# Patient Record
Sex: MALE | Race: WHITE | HISPANIC OR LATINO | Employment: FULL TIME | URBAN - METROPOLITAN AREA
[De-identification: names, ages, dates, MRNs, and addresses within clinical notes are randomized per-mention and may not be internally consistent; named-entity substitution may affect disease eponyms.]

---

## 2022-09-20 ENCOUNTER — HOSPITAL ENCOUNTER (EMERGENCY)
Facility: HOSPITAL | Age: 37
Discharge: HOME | End: 2022-09-20
Attending: EMERGENCY MEDICINE

## 2022-09-20 DIAGNOSIS — S61.319A LACERATION OF NAIL BED OF FINGER, INITIAL ENCOUNTER: ICD-10-CM

## 2022-09-20 DIAGNOSIS — S68.522A PARTIAL TRAUMATIC AMPUTATION OF LEFT THUMB THROUGH PHALANX, INITIAL ENCOUNTER: ICD-10-CM

## 2022-09-20 DIAGNOSIS — S61.019A: ICD-10-CM

## 2022-09-20 DIAGNOSIS — S62.502B OPEN AVULSION FRACTURE OF PHALANX OF LEFT THUMB, INITIAL ENCOUNTER: ICD-10-CM

## 2022-09-20 DIAGNOSIS — S61.112A LACERATION OF LEFT THUMB WITHOUT FOREIGN BODY WITH DAMAGE TO NAIL, INITIAL ENCOUNTER: Primary | ICD-10-CM

## 2022-09-20 LAB
ANION GAP SERPL CALC-SCNC: 10 MMOL/L (ref 8–16)
BASOPHILS # BLD AUTO: 0.04 K/UL (ref 0–0.2)
BASOPHILS NFR BLD: 0.7 % (ref 0–1.9)
BUN SERPL-MCNC: 12 MG/DL (ref 6–20)
CALCIUM SERPL-MCNC: 9.9 MG/DL (ref 8.7–10.5)
CHLORIDE SERPL-SCNC: 107 MMOL/L (ref 95–110)
CO2 SERPL-SCNC: 24 MMOL/L (ref 23–29)
CREAT SERPL-MCNC: 0.8 MG/DL (ref 0.5–1.4)
DIFFERENTIAL METHOD: ABNORMAL
EOSINOPHIL # BLD AUTO: 0.1 K/UL (ref 0–0.5)
EOSINOPHIL NFR BLD: 1.2 % (ref 0–8)
ERYTHROCYTE [DISTWIDTH] IN BLOOD BY AUTOMATED COUNT: 12.5 % (ref 11.5–14.5)
EST. GFR  (NO RACE VARIABLE): >60 ML/MIN/1.73 M^2
GLUCOSE SERPL-MCNC: 110 MG/DL (ref 70–110)
HCT VFR BLD AUTO: 38.4 % (ref 40–54)
HGB BLD-MCNC: 13.6 G/DL (ref 14–18)
IMM GRANULOCYTES # BLD AUTO: 0.01 K/UL (ref 0–0.04)
IMM GRANULOCYTES NFR BLD AUTO: 0.2 % (ref 0–0.5)
LYMPHOCYTES # BLD AUTO: 1.8 K/UL (ref 1–4.8)
LYMPHOCYTES NFR BLD: 30.1 % (ref 18–48)
MCH RBC QN AUTO: 31.1 PG (ref 27–31)
MCHC RBC AUTO-ENTMCNC: 35.4 G/DL (ref 32–36)
MCV RBC AUTO: 88 FL (ref 82–98)
MONOCYTES # BLD AUTO: 0.3 K/UL (ref 0.3–1)
MONOCYTES NFR BLD: 5.1 % (ref 4–15)
NEUTROPHILS # BLD AUTO: 3.7 K/UL (ref 1.8–7.7)
NEUTROPHILS NFR BLD: 62.7 % (ref 38–73)
NRBC BLD-RTO: 0 /100 WBC
PLATELET # BLD AUTO: 287 K/UL (ref 150–450)
PMV BLD AUTO: 8.3 FL (ref 9.2–12.9)
POTASSIUM SERPL-SCNC: 3.8 MMOL/L (ref 3.5–5.1)
RBC # BLD AUTO: 4.38 M/UL (ref 4.6–6.2)
SARS-COV-2 RDRP RESP QL NAA+PROBE: NEGATIVE
SODIUM SERPL-SCNC: 141 MMOL/L (ref 136–145)
WBC # BLD AUTO: 5.91 K/UL (ref 3.9–12.7)

## 2022-09-20 PROCEDURE — 26951 PR AMPUTATION FINGER/THUMB: ICD-10-PCS | Mod: FA,,, | Performed by: ORTHOPAEDIC SURGERY

## 2022-09-20 PROCEDURE — 96376 TX/PRO/DX INJ SAME DRUG ADON: CPT

## 2022-09-20 PROCEDURE — 63600175 PHARM REV CODE 636 W HCPCS: Performed by: ORTHOPAEDIC SURGERY

## 2022-09-20 PROCEDURE — 99285 EMERGENCY DEPT VISIT HI MDM: CPT | Mod: 25

## 2022-09-20 PROCEDURE — U0002 COVID-19 LAB TEST NON-CDC: HCPCS | Performed by: EMERGENCY MEDICINE

## 2022-09-20 PROCEDURE — 13131 CMPLX RPR F/C/C/M/N/AX/G/H/F: CPT

## 2022-09-20 PROCEDURE — 96374 THER/PROPH/DIAG INJ IV PUSH: CPT

## 2022-09-20 PROCEDURE — 63600175 PHARM REV CODE 636 W HCPCS: Performed by: EMERGENCY MEDICINE

## 2022-09-20 PROCEDURE — 71000015 HC POSTOP RECOV 1ST HR: Performed by: ORTHOPAEDIC SURGERY

## 2022-09-20 PROCEDURE — 85025 COMPLETE CBC W/AUTO DIFF WBC: CPT | Performed by: EMERGENCY MEDICINE

## 2022-09-20 PROCEDURE — 26951 AMPUTATION OF FINGER/THUMB: CPT | Mod: FA,,, | Performed by: ORTHOPAEDIC SURGERY

## 2022-09-20 PROCEDURE — 80048 BASIC METABOLIC PNL TOTAL CA: CPT | Performed by: EMERGENCY MEDICINE

## 2022-09-20 PROCEDURE — 96375 TX/PRO/DX INJ NEW DRUG ADDON: CPT

## 2022-09-20 RX ORDER — FENTANYL CITRATE 50 UG/ML
25 INJECTION, SOLUTION INTRAMUSCULAR; INTRAVENOUS EVERY 5 MIN PRN
Status: CANCELLED | OUTPATIENT
Start: 2022-09-20

## 2022-09-20 RX ORDER — CEFAZOLIN SODIUM 2 G/50ML
2 SOLUTION INTRAVENOUS
Status: COMPLETED | OUTPATIENT
Start: 2022-09-20 | End: 2022-09-20

## 2022-09-20 RX ORDER — HYDROMORPHONE HYDROCHLORIDE 1 MG/ML
1 INJECTION, SOLUTION INTRAMUSCULAR; INTRAVENOUS; SUBCUTANEOUS ONCE
Status: DISCONTINUED | OUTPATIENT
Start: 2022-09-20 | End: 2022-09-20

## 2022-09-20 RX ORDER — MORPHINE SULFATE 4 MG/ML
4 INJECTION, SOLUTION INTRAMUSCULAR; INTRAVENOUS
Status: COMPLETED | OUTPATIENT
Start: 2022-09-20 | End: 2022-09-20

## 2022-09-20 RX ORDER — DIPHENHYDRAMINE HYDROCHLORIDE 50 MG/ML
25 INJECTION INTRAMUSCULAR; INTRAVENOUS EVERY 6 HOURS PRN
Status: CANCELLED | OUTPATIENT
Start: 2022-09-20

## 2022-09-20 RX ORDER — ONDANSETRON 2 MG/ML
4 INJECTION INTRAMUSCULAR; INTRAVENOUS EVERY 6 HOURS PRN
Status: DISCONTINUED | OUTPATIENT
Start: 2022-09-20 | End: 2022-09-21 | Stop reason: HOSPADM

## 2022-09-20 RX ORDER — MORPHINE SULFATE 4 MG/ML
6 INJECTION, SOLUTION INTRAMUSCULAR; INTRAVENOUS
Status: COMPLETED | OUTPATIENT
Start: 2022-09-20 | End: 2022-09-20

## 2022-09-20 RX ORDER — ONDANSETRON 2 MG/ML
4 INJECTION INTRAMUSCULAR; INTRAVENOUS
Status: COMPLETED | OUTPATIENT
Start: 2022-09-20 | End: 2022-09-20

## 2022-09-20 RX ORDER — ALBUTEROL SULFATE 0.83 MG/ML
2.5 SOLUTION RESPIRATORY (INHALATION) EVERY 4 HOURS PRN
Status: DISCONTINUED | OUTPATIENT
Start: 2022-09-20 | End: 2022-09-21 | Stop reason: HOSPADM

## 2022-09-20 RX ORDER — HYDROMORPHONE HYDROCHLORIDE 2 MG/ML
2 INJECTION, SOLUTION INTRAMUSCULAR; INTRAVENOUS; SUBCUTANEOUS ONCE
Status: COMPLETED | OUTPATIENT
Start: 2022-09-20 | End: 2022-09-20

## 2022-09-20 RX ORDER — CEFAZOLIN SODIUM 1 G/3ML
1 INJECTION, POWDER, FOR SOLUTION INTRAMUSCULAR; INTRAVENOUS
Status: COMPLETED | OUTPATIENT
Start: 2022-09-20 | End: 2022-09-20

## 2022-09-20 RX ORDER — CHLORHEXIDINE GLUCONATE ORAL RINSE 1.2 MG/ML
10 SOLUTION DENTAL
Status: DISCONTINUED | OUTPATIENT
Start: 2022-09-20 | End: 2022-09-21 | Stop reason: HOSPADM

## 2022-09-20 RX ORDER — SODIUM CHLORIDE, SODIUM LACTATE, POTASSIUM CHLORIDE, CALCIUM CHLORIDE 600; 310; 30; 20 MG/100ML; MG/100ML; MG/100ML; MG/100ML
1000 INJECTION, SOLUTION INTRAVENOUS
Status: COMPLETED | OUTPATIENT
Start: 2022-09-20 | End: 2022-09-20

## 2022-09-20 RX ORDER — HYDROCODONE BITARTRATE AND ACETAMINOPHEN 5; 325 MG/1; MG/1
1 TABLET ORAL EVERY 6 HOURS PRN
Qty: 20 TABLET | Refills: 0 | Status: SHIPPED | OUTPATIENT
Start: 2022-09-20 | End: 2022-09-23 | Stop reason: SDUPTHER

## 2022-09-20 RX ORDER — MEPERIDINE HYDROCHLORIDE 25 MG/ML
12.5 INJECTION INTRAMUSCULAR; INTRAVENOUS; SUBCUTANEOUS ONCE
Status: CANCELLED | OUTPATIENT
Start: 2022-09-20 | End: 2022-09-20

## 2022-09-20 RX ORDER — CEPHALEXIN 500 MG/1
500 CAPSULE ORAL EVERY 6 HOURS
Qty: 20 CAPSULE | Refills: 0 | Status: SHIPPED | OUTPATIENT
Start: 2022-09-20 | End: 2022-09-25

## 2022-09-20 RX ORDER — HYDROCODONE BITARTRATE AND ACETAMINOPHEN 5; 325 MG/1; MG/1
1 TABLET ORAL
Status: CANCELLED | OUTPATIENT
Start: 2022-09-20

## 2022-09-20 RX ORDER — HYDROCODONE BITARTRATE AND ACETAMINOPHEN 5; 325 MG/1; MG/1
1 TABLET ORAL EVERY 4 HOURS PRN
Qty: 20 TABLET | Refills: 0 | Status: SHIPPED | OUTPATIENT
Start: 2022-09-20 | End: 2022-09-20 | Stop reason: SDUPTHER

## 2022-09-20 RX ORDER — ONDANSETRON 2 MG/ML
4 INJECTION INTRAMUSCULAR; INTRAVENOUS ONCE AS NEEDED
Status: CANCELLED | OUTPATIENT
Start: 2022-09-20 | End: 2034-02-16

## 2022-09-20 RX ORDER — BUPIVACAINE HYDROCHLORIDE 2.5 MG/ML
4 INJECTION, SOLUTION INFILTRATION; PERINEURAL
Status: DISCONTINUED | OUTPATIENT
Start: 2022-09-20 | End: 2022-09-21 | Stop reason: HOSPADM

## 2022-09-20 RX ORDER — LIDOCAINE HYDROCHLORIDE 10 MG/ML
10 INJECTION, SOLUTION EPIDURAL; INFILTRATION; INTRACAUDAL; PERINEURAL ONCE
Status: DISCONTINUED | OUTPATIENT
Start: 2022-09-20 | End: 2022-09-21 | Stop reason: HOSPADM

## 2022-09-20 RX ORDER — CEPHALEXIN 500 MG/1
500 CAPSULE ORAL EVERY 6 HOURS
Qty: 20 CAPSULE | Refills: 0 | Status: SHIPPED | OUTPATIENT
Start: 2022-09-20 | End: 2022-09-20 | Stop reason: SDUPTHER

## 2022-09-20 RX ADMIN — HYDROMORPHONE HYDROCHLORIDE 2 MG: 2 INJECTION INTRAMUSCULAR; INTRAVENOUS; SUBCUTANEOUS at 11:09

## 2022-09-20 RX ADMIN — CEFAZOLIN SODIUM 2 G: 2 SOLUTION INTRAVENOUS at 10:09

## 2022-09-20 RX ADMIN — MORPHINE SULFATE 6 MG: 4 INJECTION INTRAVENOUS at 12:09

## 2022-09-20 RX ADMIN — ONDANSETRON 4 MG: 2 INJECTION INTRAMUSCULAR; INTRAVENOUS at 10:09

## 2022-09-20 RX ADMIN — CEFAZOLIN 1 G: 330 INJECTION, POWDER, FOR SOLUTION INTRAMUSCULAR; INTRAVENOUS at 11:09

## 2022-09-20 RX ADMIN — ONDANSETRON 4 MG: 2 INJECTION INTRAMUSCULAR; INTRAVENOUS at 12:09

## 2022-09-20 RX ADMIN — MORPHINE SULFATE 4 MG: 4 INJECTION INTRAVENOUS at 10:09

## 2022-09-20 RX ADMIN — SODIUM CHLORIDE, SODIUM LACTATE, POTASSIUM CHLORIDE, AND CALCIUM CHLORIDE 1000 ML: .6; .31; .03; .02 INJECTION, SOLUTION INTRAVENOUS at 12:09

## 2022-09-20 NOTE — ASSESSMENT & PLAN NOTE
I discussed with the patient and the risks and benefits of the procedure and the need for closure to prevent infection.  Patient understands and agrees with this plan we will plan to proceed this afternoon with revision of his partial amputation wound closure along with debridement as needed.  Patient understands and agrees with this procedure and has elected to proceed.  Consents were signed.

## 2022-09-20 NOTE — SUBJECTIVE & OBJECTIVE
"Past Medical History:   Diagnosis Date    Multiple closed fractures of right lower extremity and ribs     Multiple facial bone fractures        Past Surgical History:   Procedure Laterality Date    ORIF right distal tibia fracture Right        Review of patient's allergies indicates:  Not on File    Current Facility-Administered Medications   Medication    tetanus and diphther. tox (PF)(TD)     No current outpatient medications on file.     Family History    None       Tobacco Use    Smoking status: Not on file    Smokeless tobacco: Not on file   Substance and Sexual Activity    Alcohol use: Not on file    Drug use: Not on file    Sexual activity: Not on file     Review of Systems   Constitutional: Negative for decreased appetite.   HENT:          History of multiple facial fractures   Cardiovascular:  Negative for chest pain.   Respiratory:  Negative for cough.    Hematologic/Lymphatic: Does not bruise/bleed easily.   Musculoskeletal:  Positive for joint pain.   Gastrointestinal:  Negative for abdominal pain, nausea and vomiting.   Neurological:  Negative for weakness.        Intermittent memory loss which traumatic brain injury   All other systems reviewed and are negative.  Objective:     Vital Signs (Most Recent):  Temp: 98.2 °F (36.8 °C) (09/20/22 1003)  Pulse: 85 (09/20/22 1003)  Resp: 16 (09/20/22 1231)  BP: 135/79 (09/20/22 1003)  SpO2: 99 % (09/20/22 1003) Vital Signs (24h Range):  Temp:  [98.2 °F (36.8 °C)] 98.2 °F (36.8 °C)  Pulse:  [85] 85  Resp:  [16-20] 16  SpO2:  [99 %] 99 %  BP: (135)/(79) 135/79     Weight: 59.6 kg (131 lb 6.3 oz)  Height: 5' 8" (172.7 cm)  Body mass index is 19.98 kg/m².    No intake or output data in the 24 hours ending 09/20/22 1319    General    Nursing note and vitals reviewed.  Constitutional: He is oriented to person, place, and time. He appears well-developed and well-nourished. No distress.   HENT:   Head: Normocephalic and atraumatic.   Eyes: EOM are normal. No scleral " icterus.   Cardiovascular:  Intact distal pulses.            Pulmonary/Chest: Effort normal. No respiratory distress.   Abdominal: Soft. He exhibits no distension.   Neurological: He is alert and oriented to person, place, and time.   Psychiatric: He has a normal mood and affect. His behavior is normal. Judgment and thought content normal.         Left Hand/Wrist Exam     Comments:  Patient has 2+ radial pulse.  Wrist and remained and fingers have normal range of motion.  Patient is currently dressed and a wet-to-dry dressing.            Significant Labs: All pertinent labs within the past 24 hours have been reviewed.    Significant Imaging: X-Ray: I have reviewed all pertinent results/findings and my personal findings are:  fracture of distal tuft of the left distal phalanx. Soft tissue defects noted.

## 2022-09-20 NOTE — ED PROVIDER NOTES
SCRIBE #1 NOTE: I, Christiano Langston, am scribing for, and in the presence of, Aileen Colón MD. I have scribed the entire note.      History      Chief Complaint   Patient presents with    Laceration     Left thumb lac by mitre saw. Tip of thumb cut off. Bleeding in triage. Pressure dressing applied       Review of patient's allergies indicates:  Not on File     HPI   HPI    9/20/2022, 10:26 AM   History obtained from the patient      History of Present Illness: Andres Angulo is a 37 y.o. male patient who presents to the Emergency Department for L thumb laceration, onset just PTA. Pt states that he was cut by a mitre saw while cutting wood. Symptoms are constant and moderate in severity. No mitigating or exacerbating factors reported. Associated sxs include L thumb pain. Patient denies any fever, chills, n/v/d, SOB, CP, weakness, numbness, dizziness, headache, and all other sxs at this time. Pt is UTD in his tetanus vaccination. Pt states that he last ate at 4 AM this morning. No further complaints or concerns at this time.     Arrival mode: Personal vehicle    PCP: Primary Doctor No       Past Medical History:  Past Medical History:   Diagnosis Date    Multiple closed fractures of right lower extremity and ribs     Multiple facial bone fractures        Past Surgical History:  Past Surgical History:   Procedure Laterality Date    ORIF right distal tibia fracture Right          Family History:  No family history on file.    Social History:  Social History     Tobacco Use    Smoking status: Not on file    Smokeless tobacco: Not on file   Substance and Sexual Activity    Alcohol use: Not on file    Drug use: Not on file    Sexual activity: Not on file       ROS   Review of Systems   Constitutional:  Negative for chills and fever.   HENT:  Negative for sore throat.    Respiratory:  Negative for shortness of breath.    Cardiovascular:  Negative for chest pain.   Gastrointestinal:  Negative for diarrhea, nausea and  vomiting.   Genitourinary:  Negative for dysuria.   Musculoskeletal:  Positive for myalgias (L thumb). Negative for back pain.   Skin:  Positive for wound (L thumb laceration). Negative for rash.   Neurological:  Negative for dizziness, weakness, light-headedness, numbness and headaches.   Hematological:  Does not bruise/bleed easily.   All other systems reviewed and are negative.    Physical Exam      Initial Vitals [09/20/22 1003]   BP Pulse Resp Temp SpO2   135/79 85 20 98.2 °F (36.8 °C) 99 %      MAP       --          Physical Exam  Nursing Notes and Vital Signs Reviewed.  Constitutional: Patient is in no acute distress. Well-developed and well-nourished.  Head: Atraumatic. Normocephalic.  Eyes: PERRL. EOM intact. Conjunctivae are not pale. No scleral icterus.  ENT: Mucous membranes are moist. Oropharynx is clear and symmetric.    Neck: Supple. Full ROM.   Cardiovascular: Regular rate. Regular rhythm. No murmurs, rubs, or gallops. Distal pulses are 2+ and symmetric.  Pulmonary/Chest: No respiratory distress. Clear to auscultation bilaterally. No wheezing or rales.  Abdominal: Soft and non-distended.  There is no tenderness.  No rebound, guarding, or rigidity.   Musculoskeletal: Moves all extremities. No edema.  Left Hand: Large laceration involving L thumb and 3/4 of nailbed. Site is actively bleeding but is controlled with direct pressure. Full flexion and extension of the wrist. Radial, median, and ulnar nerves are intact. Radial and ulnar pulses are 2+. Normal capillary refill.  Distal sensation is intact. NVI distally.   Skin: Warm.  Neurological:  Alert, awake, and appropriate.  Normal speech.  No acute focal neurological deficits are appreciated.  Psychiatric: Normal affect. Good eye contact. Appropriate in content.        ED Course    Procedures  ED Vital Signs:  Vitals:    09/20/22 1003 09/20/22 1044 09/20/22 1124 09/20/22 1231   BP: 135/79      Pulse: 85      Resp: 20 20  16   Temp: 98.2 °F (36.8 °C)     "  TempSrc: Oral      SpO2: 99%      Weight: 131 lb 6.3 oz (59.6 kg)      Height:   5' 8" (1.727 m)        Abnormal Lab Results:  Labs Reviewed   CBC W/ AUTO DIFFERENTIAL - Abnormal; Notable for the following components:       Result Value    RBC 4.38 (*)     Hemoglobin 13.6 (*)     Hematocrit 38.4 (*)     MCH 31.1 (*)     MPV 8.3 (*)     All other components within normal limits   BASIC METABOLIC PANEL   SARS-COV-2 RNA AMPLIFICATION, QUAL        All Lab Results:  Results for orders placed or performed during the hospital encounter of 09/20/22   CBC auto differential   Result Value Ref Range    WBC 5.91 3.90 - 12.70 K/uL    RBC 4.38 (L) 4.60 - 6.20 M/uL    Hemoglobin 13.6 (L) 14.0 - 18.0 g/dL    Hematocrit 38.4 (L) 40.0 - 54.0 %    MCV 88 82 - 98 fL    MCH 31.1 (H) 27.0 - 31.0 pg    MCHC 35.4 32.0 - 36.0 g/dL    RDW 12.5 11.5 - 14.5 %    Platelets 287 150 - 450 K/uL    MPV 8.3 (L) 9.2 - 12.9 fL    Immature Granulocytes 0.2 0.0 - 0.5 %    Gran # (ANC) 3.7 1.8 - 7.7 K/uL    Immature Grans (Abs) 0.01 0.00 - 0.04 K/uL    Lymph # 1.8 1.0 - 4.8 K/uL    Mono # 0.3 0.3 - 1.0 K/uL    Eos # 0.1 0.0 - 0.5 K/uL    Baso # 0.04 0.00 - 0.20 K/uL    nRBC 0 0 /100 WBC    Gran % 62.7 38.0 - 73.0 %    Lymph % 30.1 18.0 - 48.0 %    Mono % 5.1 4.0 - 15.0 %    Eosinophil % 1.2 0.0 - 8.0 %    Basophil % 0.7 0.0 - 1.9 %    Differential Method Automated    Basic metabolic panel   Result Value Ref Range    Sodium 141 136 - 145 mmol/L    Potassium 3.8 3.5 - 5.1 mmol/L    Chloride 107 95 - 110 mmol/L    CO2 24 23 - 29 mmol/L    Glucose 110 70 - 110 mg/dL    BUN 12 6 - 20 mg/dL    Creatinine 0.8 0.5 - 1.4 mg/dL    Calcium 9.9 8.7 - 10.5 mg/dL    Anion Gap 10 8 - 16 mmol/L    eGFR >60 >60 mL/min/1.73 m^2   COVID-19 Rapid Screening   Result Value Ref Range    SARS-CoV-2 RNA, Amplification, Qual Negative Negative     Imaging Results:  Imaging Results              X-Ray Finger 2 or More Views Left (Final result)  Result time 09/20/22 10:59:39      " Final result by Artemio Araujo MD (09/20/22 10:59:39)                   Impression:      As above      Electronically signed by: Artemio Araujo MD  Date:    09/20/2022  Time:    10:59               Narrative:    EXAMINATION:  XR FINGER 2 OR MORE VIEWS LEFT    CLINICAL HISTORY:  left thumb injury;    TECHNIQUE:  AP, oblique and lateral views of the left thumb    COMPARISON:  None    FINDINGS:  A bandage overlies the distal thumb.  There is absence of a portion of the distal thumb soft tissues and the tuft of the distal thumb consistent with the history provided of injury to the thumb by a mitre saw.  No other osseous or soft tissue abnormalities are identified.                                              The Emergency Provider reviewed the vital signs and test results, which are outlined above.    ED Discussion     11:34 AM: Discussed pt's case with Dr. Black (Orthopedic Surgery) who recommends washing out the wound in the ED and placing a wet-to-dry dressing. Dr. Black will have the pt taken to the OR this afternoon for washout and closure. Dr. Black agrees with current care and management of pt and accepts admission.   Admitting Service: Orthopedic Surgery  Admitting Physician: Dr. Black  Admit to: Obs Med Surg    11:44 AM: Re-evaluated pt. Pt's wound was irrigated with a copious amount of normal saline with betadine, and wet-to-dry dressing was applied. have discussed test results, shared treatment plan, and the need for admission with patient and family at bedside. Pt and family express understanding at this time and agree with all information. All questions answered. Pt and family have no further questions or concerns at this time. Pt is ready for admit.         ED Medication(s):  Medications   tetanus and diphther. tox (PF)(TD) (0.5 mLs Intramuscular Not Given 9/20/22 1030)   ceFAZolin injection 1 g (1 g Intravenous Given 9/20/22 1122)   morphine injection 4 mg (4 mg Intravenous Given 9/20/22 1044)   ondansetron  injection 4 mg (4 mg Intravenous Given 9/20/22 1045)   lactated ringers infusion (1,000 mLs Intravenous New Bag 9/20/22 1232)   morphine injection 6 mg (6 mg Intravenous Given 9/20/22 1231)   ondansetron injection 4 mg (4 mg Intravenous Given 9/20/22 1231)         New Prescriptions    No medications on file           Medical Decision Making    Medical Decision Making:   Clinical Tests:   Lab Tests: Ordered and Reviewed  Radiological Study: Ordered and Reviewed         Scribe Attestation:   Scribe #1: I performed the above scribed service and the documentation accurately describes the services I performed. I attest to the accuracy of the note.    Attending:   Physician Attestation Statement for Scribe #1: I, Aileen Colón MD, personally performed the services described in this documentation, as scribed by Christiano Langston, in my presence, and it is both accurate and complete.          Clinical Impression       ICD-10-CM ICD-9-CM   1. Laceration of left thumb without foreign body with damage to nail, initial encounter  S61.112A 883.0   2. Partial traumatic amputation of left thumb through phalanx, initial encounter  S68.522A 885.0   3. Deep laceration of thumb  S61.019A 883.0   4. Open avulsion fracture of phalanx of left thumb, initial encounter  S62.502B 816.10   5. Laceration of nail bed of finger, initial encounter  S61.319A 883.0       Disposition:   Disposition: Placed in Observation  Condition: Fair       Aileen Colón MD  09/20/22 5217

## 2022-09-20 NOTE — CONSULTS
[facility] - Emergency Dept.  Orthopedics  Consult Note    Patient Name: Andres Angulo  MRN: [id number]  Admission Date: 9/20/2022  Hospital Length of Stay: 0 days  Attending Provider: Aileen Colón MD  Primary Care Provider: Primary Doctor No    Patient information was obtained from patient, past medical records and ER records.     Consults  Subjective:     Principal Problem:<principal problem not specified>    Chief Complaint:   Chief Complaint   Patient presents with    Laceration     Left thumb lac by mitre saw. Tip of thumb cut off. Bleeding in triage. Pressure dressing applied        HPI: Patient is a very pleasant 36yo RHD  male who sustained a partial distal phalanx amputation of his left thumb while using a miter saw earlier this morning.  Patient denies any prior history of trauma to his hand or any other additional injuries.  He was seen examined here at [facility] where his wound was irrigated and dressed.  He has received tetanus shot and IV antibiotics period.      Past Medical History:   Diagnosis Date    Multiple closed fractures of right lower extremity and ribs     Multiple facial bone fractures        Past Surgical History:   Procedure Laterality Date    ORIF right distal tibia fracture Right        Review of patient's allergies indicates:  Not on File    Current Facility-Administered Medications   Medication    tetanus and diphther. tox (PF)(TD)     No current outpatient medications on file.     Family History    None       Tobacco Use    Smoking status: Not on file    Smokeless tobacco: Not on file   Substance and Sexual Activity    Alcohol use: Not on file    Drug use: Not on file    Sexual activity: Not on file     Review of Systems   Constitutional: Negative for decreased appetite.   HENT:          History of multiple facial fractures   Cardiovascular:  Negative for chest pain.   Respiratory:  Negative for cough.    Hematologic/Lymphatic: Does not bruise/bleed  "easily.   Musculoskeletal:  Positive for joint pain.   Gastrointestinal:  Negative for abdominal pain, nausea and vomiting.   Neurological:  Negative for weakness.        Intermittent memory loss which traumatic brain injury   All other systems reviewed and are negative.  Objective:     Vital Signs (Most Recent):  Temp: 98.2 °F (36.8 °C) (09/20/22 1003)  Pulse: 85 (09/20/22 1003)  Resp: 16 (09/20/22 1231)  BP: 135/79 (09/20/22 1003)  SpO2: 99 % (09/20/22 1003) Vital Signs (24h Range):  Temp:  [98.2 °F (36.8 °C)] 98.2 °F (36.8 °C)  Pulse:  [85] 85  Resp:  [16-20] 16  SpO2:  [99 %] 99 %  BP: (135)/(79) 135/79     Weight: 59.6 kg (131 lb 6.3 oz)  Height: 5' 8" (172.7 cm)  Body mass index is 19.98 kg/m².    No intake or output data in the 24 hours ending 09/20/22 1319    General    Nursing note and vitals reviewed.  Constitutional: He is oriented to person, place, and time. He appears well-developed and well-nourished. No distress.   HENT:   Head: Normocephalic and atraumatic.   Eyes: EOM are normal. No scleral icterus.   Cardiovascular:  Intact distal pulses.            Pulmonary/Chest: Effort normal. No respiratory distress.   Abdominal: Soft. He exhibits no distension.   Neurological: He is alert and oriented to person, place, and time.   Psychiatric: He has a normal mood and affect. His behavior is normal. Judgment and thought content normal.         Left Hand/Wrist Exam     Comments:  Patient has 2+ radial pulse.  Wrist and remained and fingers have normal range of motion.  Patient is currently dressed and a wet-to-dry dressing.            Significant Labs: All pertinent labs within the past 24 hours have been reviewed.    Significant Imaging: X-Ray: I have reviewed all pertinent results/findings and my personal findings are:  fracture of distal tuft of the left distal phalanx. Soft tissue defects noted.     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  Patient understands and agrees with this plan we will plan to proceed this afternoon with revision of his partial amputation wound closure along with debridement as needed.  Patient understands and agrees with this procedure and has elected to proceed.  Consents were signed.        Thank you for your consult. I will follow-up with patient. Please contact us if you have any additional questions.    Cherry Black MD, [facility]  Orthopedics  [facility] - Emergency Dept.

## 2022-09-20 NOTE — HPI
Patient is a very pleasant 38yo RHD  male who sustained a partial distal phalanx amputation of his left thumb while using a miter saw earlier this morning.  Patient denies any prior history of trauma to his hand or any other additional injuries.  He was seen examined here at [facility] where his wound was irrigated and dressed.  He has received tetanus shot and IV antibiotics period.

## 2022-09-21 ENCOUNTER — TELEPHONE (OUTPATIENT)
Dept: ORTHOPEDICS | Facility: CLINIC | Age: 37
End: 2022-09-21

## 2022-09-21 VITALS
HEIGHT: 68 IN | OXYGEN SATURATION: 98 % | WEIGHT: 131.38 LBS | RESPIRATION RATE: 17 BRPM | DIASTOLIC BLOOD PRESSURE: 74 MMHG | HEART RATE: 79 BPM | SYSTOLIC BLOOD PRESSURE: 125 MMHG | TEMPERATURE: 98 F | BODY MASS INDEX: 19.91 KG/M2

## 2022-09-21 NOTE — DISCHARGE INSTRUCTIONS
Keep dressing clean, dry, and intact until clinic visit.   Call Dr. Black's office for scheduling if no one calls you by Thursday.

## 2022-09-21 NOTE — PROCEDURES
DATE OF PROCEDURE: 09/20/2022    SURGEON:  Cherry Black MD    ASSISTANT:  Tyrell Antonio RN     ANESTHESIA: local digital block    SPECIMENS: none    BLOOD LOSS: 10mL    DRAINS:  None    IMPLANTS: * No implants in log *    COMPLICATIONS: none    INDICATIONS:  Patient is a very pleasant 37-year-old  male who sustained a partial amputation of his left thumb through the distal phalanx using a miter saw.  Patient would like to proceed with wound closure revision of the amputation.  He understands inherent risks and benefits of the procedure has elected to proceed.    PROCEDURE IN DETAIL:     After consents were verified and site was marked, time-out was performed verifying the procedure location and antibiotic administration.  Patient was also given pain medicine.  10 cc of 1% lidocaine was used for a digital block to the thumb.  The thumb was then prepped and draped in the normal sterile fashion.  A rongeur was used to remove the very distal distal edge of the phalanx and to rongeur down any sharp edges.  Hemostat was used to remove the patient's remaining nail.  The wound was then copiously irrigated with normal saline.  The digit was then loosely approximated with 3-0 nylon suture.  Care was taken to obliterate the remainder of the nail.  A postoperative nerve block was then placed with 10 cc of 0.25% Marcaine.  The wounds were then dressed with Xeroform 4x4s Eliezer and an Ace bandage.    Patient tolerated the procedure well.

## 2022-09-21 NOTE — PLAN OF CARE
Procedure complete, pt prepared for discharge to home. POC and discharge instructions reviewed with pt and pt's spouse. No questions noted.

## 2022-09-21 NOTE — TELEPHONE ENCOUNTER
Spoke with patient and scheduled his post op appointment for Friday 09/23/2022 per Dr Black. Patient verbalized understanding of appointment date, time and location.

## 2022-09-21 NOTE — PLAN OF CARE
Pt prepared for bedside procedure to repair partial left thumb amputation. All time outs called, pt, spouse, surgeon, and assistant agree for right procedure, right location. All pertinent labs and meds reviewed.

## 2022-09-23 ENCOUNTER — OFFICE VISIT (OUTPATIENT)
Dept: ORTHOPEDICS | Facility: CLINIC | Age: 37
End: 2022-09-23

## 2022-09-23 VITALS
HEART RATE: 63 BPM | TEMPERATURE: 98 F | BODY MASS INDEX: 19.91 KG/M2 | DIASTOLIC BLOOD PRESSURE: 65 MMHG | SYSTOLIC BLOOD PRESSURE: 104 MMHG | HEIGHT: 68 IN | WEIGHT: 131.38 LBS

## 2022-09-23 DIAGNOSIS — S68.522D PARTIAL TRAUMATIC AMPUTATION OF LEFT THUMB THROUGH PHALANX, SUBSEQUENT ENCOUNTER: Primary | ICD-10-CM

## 2022-09-23 PROCEDURE — 99213 OFFICE O/P EST LOW 20 MIN: CPT | Mod: S$PBB,,, | Performed by: ORTHOPAEDIC SURGERY

## 2022-09-23 PROCEDURE — 99999 PR PBB SHADOW E&M-EST. PATIENT-LVL III: CPT | Mod: PBBFAC,,, | Performed by: ORTHOPAEDIC SURGERY

## 2022-09-23 PROCEDURE — 99999 PR PBB SHADOW E&M-EST. PATIENT-LVL III: ICD-10-PCS | Mod: PBBFAC,,, | Performed by: ORTHOPAEDIC SURGERY

## 2022-09-23 PROCEDURE — 99213 PR OFFICE/OUTPT VISIT, EST, LEVL III, 20-29 MIN: ICD-10-PCS | Mod: S$PBB,,, | Performed by: ORTHOPAEDIC SURGERY

## 2022-09-23 PROCEDURE — 99213 OFFICE O/P EST LOW 20 MIN: CPT | Mod: PBBFAC | Performed by: ORTHOPAEDIC SURGERY

## 2022-09-23 RX ORDER — IBUPROFEN 800 MG/1
800 TABLET ORAL EVERY 8 HOURS PRN
Qty: 30 TABLET | Refills: 0 | Status: SHIPPED | OUTPATIENT
Start: 2022-09-23 | End: 2022-10-03 | Stop reason: SDUPTHER

## 2022-09-23 RX ORDER — HYDROCODONE BITARTRATE AND ACETAMINOPHEN 5; 325 MG/1; MG/1
1 TABLET ORAL EVERY 4 HOURS PRN
Qty: 28 TABLET | Refills: 0 | Status: SHIPPED | OUTPATIENT
Start: 2022-09-23 | End: 2022-10-03 | Stop reason: SDUPTHER

## 2022-09-25 NOTE — PROGRESS NOTES
Patient ID: Andres Angulo is a 37 y.o. male.    Chief Complaint: Post-op Evaluation and Pain of the Left Hand      HPI: Andres Angulo is a very pleasant 37 y.o.  male who is here for follow-up of his left thumb.  He is 3 days status post debridement and wound closure of his left thumb partial amputation performed by me on September 20, 2022.  Patient is here today for his 1st postoperative follow-up.  He is accompanied today by his wife.  He does report some pain at rest was worse overnight all the way up to 10/10.  Currently his pain is well controlled.  He currently has a prescription for Norco for pain control.  Of note he does have a prior history of trauma to the thumb prior to this injury requiring tendon repair.    Past Medical History:   Diagnosis Date    Multiple closed fractures of right lower extremity and ribs     Multiple facial bone fractures      Past Surgical History:   Procedure Laterality Date    INCISION AND DRAINAGE OF HAND Left 9/20/2022    Procedure: INCISION AND DRAINAGE, HAND;  Surgeon: Cherry Black MD;  Location: [facility];  Service: Orthopedics;  Laterality: Left;    ORIF right distal tibia fracture Right      History reviewed. No pertinent family history.  Social History     Socioeconomic History    Marital status:    Tobacco Use    Smoking status: Every Day     Packs/day: 0.50     Types: Cigarettes    Smokeless tobacco: Never   Substance and Sexual Activity    Alcohol use: Never    Drug use: Never     Medication List with Changes/Refills   New Medications    IBUPROFEN (ADVIL,MOTRIN) 800 MG TABLET    Take 1 tablet (800 mg total) by mouth every 8 (eight) hours as needed for Pain. Take with food   Current Medications    CEPHALEXIN (KEFLEX) 500 MG CAPSULE    Take 1 capsule (500 mg total) by mouth every 6 (six) hours. for 5 days   Changed and/or Refilled Medications    Modified Medication Previous Medication    HYDROCODONE-ACETAMINOPHEN (NORCO) 5-325 MG PER  TABLET HYDROcodone-acetaminophen (NORCO) 5-325 mg per tablet       Take 1 tablet by mouth every 4 (four) hours as needed for Pain.    Take 1 tablet by mouth every 6 (six) hours as needed for Pain.     Review of patient's allergies indicates:  No Known Allergies    Physical Exam:     Wt Readings from Last 3 Encounters:   09/23/22 59.6 kg (131 lb 6.3 oz)   09/20/22 59.6 kg (131 lb 6.3 oz)     Temp Readings from Last 3 Encounters:   09/23/22 97.6 °F (36.4 °C)   09/20/22 97.9 °F (36.6 °C) (Temporal)     BP Readings from Last 3 Encounters:   09/23/22 104/65   09/20/22 125/74     Pulse Readings from Last 3 Encounters:   09/23/22 63   09/20/22 79       Body mass index is 19.98 kg/m².      General Appearance:   cooperative, no acute distress, appropriate for age                    Neurologic:  Alert and oriented x3                            Pysch:  Appropriate mood and affect                              Head:  Normocephalic, atraumatic                             EENT:  EOM grossly intact, no icterus                   Respiratory:  non-labored; no audible wheezing                      Abdomen:  non-distended           Musculoskeletal:  Smooth concentric gait without a gait aid; left upper extremity:  Skin is clean dry and intact with nylon sutures in place.  There is no erythema or drainage.  Postop patient does have some postoperative expected swelling and stiffness.  Mildly decreased sensation to light touch distally.               Skin/Hair/Nails:  Skin warm, dry, and intact, no rashes or abnormal dyspigmentation    Assessment:       Encounter Diagnosis   Name Primary?    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  -     ibuprofen (ADVIL,MOTRIN) 800 MG tablet; Take 1 tablet (800 mg total) by mouth every 8 (eight) hours as needed for Pain. Take with food      Patient is progressing as expected after a partial traumatic amputation of the left thumb which was debrided and closed.  I did renew his pain medicine prescription and added ibuprofen as needed to help with the pain.  I would like to see him back in clinic in 1 week for recheck with x-rays.    Follow up in about 1 week (around 9/30/2022).    The patient understands, chooses and consents to this plan and accepts all   the risks which include but are not limited to the risks mentioned above.     Disclaimer: This note was prepared using a voice recognition system and is likely to have sound alike errors within the text.         Cherry Black MD, KAZ, FAAOS  Orthopaedic Surgeon

## 2022-09-30 ENCOUNTER — OFFICE VISIT (OUTPATIENT)
Dept: ORTHOPEDICS | Facility: CLINIC | Age: 37
End: 2022-09-30

## 2022-09-30 VITALS
HEART RATE: 79 BPM | TEMPERATURE: 98 F | WEIGHT: 125 LBS | SYSTOLIC BLOOD PRESSURE: 127 MMHG | HEIGHT: 68 IN | DIASTOLIC BLOOD PRESSURE: 87 MMHG | BODY MASS INDEX: 18.94 KG/M2

## 2022-09-30 DIAGNOSIS — S68.522D PARTIAL TRAUMATIC AMPUTATION OF LEFT THUMB THROUGH PHALANX, SUBSEQUENT ENCOUNTER: Primary | ICD-10-CM

## 2022-09-30 PROCEDURE — 99213 OFFICE O/P EST LOW 20 MIN: CPT | Mod: PBBFAC | Performed by: ORTHOPAEDIC SURGERY

## 2022-09-30 PROCEDURE — 99024 POSTOP FOLLOW-UP VISIT: CPT | Mod: S$PBB,,, | Performed by: ORTHOPAEDIC SURGERY

## 2022-09-30 PROCEDURE — 99024 PR POST-OP FOLLOW-UP VISIT: ICD-10-PCS | Mod: S$PBB,,, | Performed by: ORTHOPAEDIC SURGERY

## 2022-09-30 PROCEDURE — 99999 PR PBB SHADOW E&M-EST. PATIENT-LVL III: CPT | Mod: PBBFAC,,, | Performed by: ORTHOPAEDIC SURGERY

## 2022-09-30 PROCEDURE — 99999 PR PBB SHADOW E&M-EST. PATIENT-LVL III: ICD-10-PCS | Mod: PBBFAC,,, | Performed by: ORTHOPAEDIC SURGERY

## 2022-09-30 NOTE — PROGRESS NOTES
Patient ID: Andres Angulo is a 37 y.o. male.    Chief Complaint: Post-op Evaluation and Pain of the Left Hand      HPI: Andres Angulo is a very pleasant 37 y.o.  male who is here for follow-up of his left thumb.  He is 10 days status post debridement and wound closure of his left thumb partial amputation performed by me on September 20, 2022.  Patient is here today for his 2nd postoperative follow-up.  Last seen by me on September 23, 2022 for a wound check.  His pain was getting better until he bumped the thumb yesterday.  He has small amount of bleeding and has had increased pain after that.  He is currently on Norco and ibuprofen for pain. his pain today as 8/10.    Past Medical History:   Diagnosis Date    Multiple closed fractures of right lower extremity and ribs     Multiple facial bone fractures      Past Surgical History:   Procedure Laterality Date    INCISION AND DRAINAGE OF HAND Left 9/20/2022    Procedure: INCISION AND DRAINAGE, HAND;  Surgeon: Cherry Black MD;  Location: [facility];  Service: Orthopedics;  Laterality: Left;    ORIF right distal tibia fracture Right      History reviewed. No pertinent family history.  Social History     Socioeconomic History    Marital status:    Tobacco Use    Smoking status: Every Day     Packs/day: 0.23     Types: Cigarettes    Smokeless tobacco: Never   Substance and Sexual Activity    Alcohol use: Never    Drug use: Never     Medication List with Changes/Refills   Current Medications    HYDROCODONE-ACETAMINOPHEN (NORCO) 5-325 MG PER TABLET    Take 1 tablet by mouth every 4 (four) hours as needed for Pain.    IBUPROFEN (ADVIL,MOTRIN) 800 MG TABLET    Take 1 tablet (800 mg total) by mouth every 8 (eight) hours as needed for Pain. Take with food     Review of patient's allergies indicates:  No Known Allergies    Physical Exam:     Wt Readings from Last 3 Encounters:   09/30/22 56.7 kg (125 lb)   09/23/22 59.6 kg (131 lb 6.3 oz)    09/20/22 59.6 kg (131 lb 6.3 oz)     Temp Readings from Last 3 Encounters:   09/30/22 98.1 °F (36.7 °C)   09/23/22 97.6 °F (36.4 °C)   09/20/22 97.9 °F (36.6 °C) (Temporal)     BP Readings from Last 3 Encounters:   09/30/22 127/87   09/23/22 104/65   09/20/22 125/74     Pulse Readings from Last 3 Encounters:   09/30/22 79   09/23/22 63   09/20/22 79       Body mass index is 19.01 kg/m².      General Appearance:   cooperative, no acute distress, appropriate for age                    Neurologic:  Alert and oriented x3                            Pysch:  Appropriate mood and affect                              Head:  Normocephalic, atraumatic                             EENT:  EOM grossly intact, no icterus                   Respiratory:  non-labored; no audible wheezing                      Abdomen:  non-distended           Musculoskeletal:  Smooth concentric gait without a gait aid; left upper extremity:  Skin is clean dry and intact with nylon sutures in place.  There is no erythema or drainage.  Postop patient does have some postoperative expected swelling and stiffness.  Mildly decreased sensation to light touch distally. patient does have some darker areas of skin near the incision however this does not appear to be a formal eschar.  Brisk cap refill to the pulp of the digit.               Skin/Hair/Nails:  Skin warm, dry, and intact, no rashes or abnormal dyspigmentation    Assessment:       Encounter Diagnosis   Name Primary?    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  I advised him to continue with wound care in the form of washing the hand  with antibacterial soap, dressing the wound with Betadine, Adaptic, 4x4s and Coban.  We will see him back in clinic in 1 week for wound check and possible suture removal at that time.  Once the sutures are out and the wounds closed we will get him set up with outpatient occupational therapy to work on scar massage, range of motion and desensitization.    Follow up in about 1 week (around 10/7/2022).    The patient understands, chooses and consents to this plan and accepts all   the risks which include but are not limited to the risks mentioned above.     Disclaimer: This note was prepared using a voice recognition system and is likely to have sound alike errors within the text.         Cherry Black MD, KAZ, FAAOS  Orthopaedic Surgeon

## 2022-10-03 DIAGNOSIS — S68.522D PARTIAL TRAUMATIC AMPUTATION OF LEFT THUMB THROUGH PHALANX, SUBSEQUENT ENCOUNTER: ICD-10-CM

## 2022-10-03 NOTE — TELEPHONE ENCOUNTER
----- Message from Linda Colon sent at 10/3/2022  2:19 PM CDT -----  Contact: Brett  .Type:  RX Refill Request    Who Called: Andres  Refrosie or New Rx:refill  RX Name and Strength:HYDROcodone-acetaminophen (NORCO) 5-325 mg per tablet  ibuprofen (ADVIL,MOTRIN) 800 MG tablet  How is the patient currently taking it? (ex. 1XDay):as prescribed  Is this a 30 day or 90 day RX:30  Preferred Pharmacy with phone number:[facility]  [place] - [address] [address]  [address] [facility] [address]  Phone: [phone] Fax: [phone]  Local or Mail Order:local  Ordering Provider:Dr. Black  Would the patient rather a call back or a response via [facility]? call  Best Call Back Number:.[phone]  Additional Information:   Thanks  LR

## 2022-10-03 NOTE — TELEPHONE ENCOUNTER
----- Message from Torie Dave sent at 10/3/2022  9:32 AM CDT -----  Regarding: refill  Contact: pt  1. What is the name of the medication you are requesting?  Hydrocodone, Ibuprofen   2. What is the dose? n/a  3. How do you take the medication? Orally, topically, etc? n/a  4. How often do you take this medication? n/a  5. Do you need a 30 day or 90 day supply? n/a  6. How many refills are you requesting? n/a  7. What is your preferred pharmacy and location of the pharmacy? [facility]  8. Who can we contact with further questions? The pt at [phone]

## 2022-10-03 NOTE — TELEPHONE ENCOUNTER
----- Message from Torie Dave sent at 10/3/2022  9:32 AM CDT -----  Regarding: refill  Contact: pt  1. What is the name of the medication you are requesting?  Hydrocodone, Ibuprofen   2. What is the dose? n/a  3. How do you take the medication? Orally, topically, etc? n/a  4. How often do you take this medication? n/a  5. Do you need a 30 day or 90 day supply? n/a  6. How many refills are you requesting? n/a  7. What is your preferred pharmacy and location of the pharmacy? [facility]  8. Who can we contact with further questions? The pt at [phone]

## 2022-10-04 RX ORDER — IBUPROFEN 800 MG/1
800 TABLET ORAL EVERY 8 HOURS PRN
Qty: 30 TABLET | Refills: 0 | Status: SHIPPED | OUTPATIENT
Start: 2022-10-04 | End: 2022-10-28 | Stop reason: SDUPTHER

## 2022-10-04 RX ORDER — HYDROCODONE BITARTRATE AND ACETAMINOPHEN 5; 325 MG/1; MG/1
1 TABLET ORAL EVERY 4 HOURS PRN
Qty: 28 TABLET | Refills: 0 | Status: SHIPPED | OUTPATIENT
Start: 2022-10-04

## 2022-10-07 ENCOUNTER — OFFICE VISIT (OUTPATIENT)
Dept: ORTHOPEDICS | Facility: CLINIC | Age: 37
End: 2022-10-07

## 2022-10-07 VITALS
WEIGHT: 125 LBS | SYSTOLIC BLOOD PRESSURE: 121 MMHG | BODY MASS INDEX: 18.94 KG/M2 | HEIGHT: 68 IN | HEART RATE: 73 BPM | DIASTOLIC BLOOD PRESSURE: 79 MMHG | TEMPERATURE: 98 F

## 2022-10-07 DIAGNOSIS — S68.522D PARTIAL TRAUMATIC AMPUTATION OF LEFT THUMB THROUGH PHALANX, SUBSEQUENT ENCOUNTER: Primary | ICD-10-CM

## 2022-10-07 PROCEDURE — 99024 POSTOP FOLLOW-UP VISIT: CPT | Mod: ,,, | Performed by: PHYSICIAN ASSISTANT

## 2022-10-07 PROCEDURE — 99999 PR PBB SHADOW E&M-EST. PATIENT-LVL III: ICD-10-PCS | Mod: PBBFAC,,, | Performed by: PHYSICIAN ASSISTANT

## 2022-10-07 PROCEDURE — 99999 PR PBB SHADOW E&M-EST. PATIENT-LVL III: CPT | Mod: PBBFAC,,, | Performed by: PHYSICIAN ASSISTANT

## 2022-10-07 PROCEDURE — 99024 PR POST-OP FOLLOW-UP VISIT: ICD-10-PCS | Mod: ,,, | Performed by: PHYSICIAN ASSISTANT

## 2022-10-07 PROCEDURE — 99213 OFFICE O/P EST LOW 20 MIN: CPT | Mod: PBBFAC | Performed by: PHYSICIAN ASSISTANT

## 2022-10-07 NOTE — PROGRESS NOTES
"Subjective:      Patient ID: Andres Angulo is a 37 y.o. male.    Chief Complaint: Pain and Post-op Evaluation of the Left Hand      HPI: Andres Angulo is a 37-year-old male in clinic today for postoperative follow-up.  Patient is 17 days status post debridement and wound closure of his left thumb partial amputation.  Patient is doing very well at this time.  He denies any signs of infection.  He has been compliant with wound cleanings and dressing changes as previously instructed.  He does complain increased sensitivity at the tip    Past Medical History:   Diagnosis Date    Multiple closed fractures of right lower extremity and ribs     Multiple facial bone fractures        Current Outpatient Medications:     HYDROcodone-acetaminophen (NORCO) 5-325 mg per tablet, Take 1 tablet by mouth every 4 (four) hours as needed for Pain., Disp: 28 tablet, Rfl: 0    ibuprofen (ADVIL,MOTRIN) 800 MG tablet, Take 1 tablet (800 mg total) by mouth every 8 (eight) hours as needed for Pain. Take with food, Disp: 30 tablet, Rfl: 0  Review of patient's allergies indicates:  No Known Allergies    /79 (BP Location: Right arm, Patient Position: Sitting, BP Method: Medium (Automatic))   Pulse 73   Temp 97.8 °F (36.6 °C)   Ht 5' 8" (1.727 m)   Wt 56.7 kg (125 lb)   BMI 19.01 kg/m²     ROS      Objective:    Ortho Exam   Left thumb:  Sutures intact, wound margins well healed, no signs of infection   Moderate edema   Hypersensitivity around the incision and at the tip   ROM decreased secondary to edema and stiffness   Motor exam normal   Sensation and pulses intact   Cap refill brisk    GEN: Well developed, well nourished male. AAOX3. No acute distress.   Head: Normocephalic, atraumatic.   Eyes: OFE  Neck: Trachea is midline, no adenopathy  Resp: Breathing unlabored.  Neuro: Motor function normal, Cranial nerves intact  Psych: Mood and affect appropriate.       Assessment:     Imaging:  No new imaging ordered today      "   1. Partial traumatic amputation of left thumb through phalanx, subsequent encounter          Plan:       Sutures removed, patient instructed on normal wound care with soap water.  Recommended outpatient occupational therapy, but the patient does not wish to do this as he is self-pay.  Discussed ROM exercises as well as some things he can do for desensitization.  Patient should return to clinic in about 2 weeks for further evaluation.  Patient instructed to continue to monitor for signs of infection and return sooner if concerned       Follow up in about 2 weeks (around 10/21/2022).         Patient note was created using MModal Dictation.  Any errors in syntax or even information may not have been identified and edited on initial review prior to signing this note.

## 2022-10-28 ENCOUNTER — OFFICE VISIT (OUTPATIENT)
Dept: ORTHOPEDICS | Facility: CLINIC | Age: 37
End: 2022-10-28

## 2022-10-28 VITALS — HEIGHT: 68 IN | WEIGHT: 125 LBS | BODY MASS INDEX: 18.94 KG/M2

## 2022-10-28 DIAGNOSIS — S68.522D PARTIAL TRAUMATIC AMPUTATION OF LEFT THUMB THROUGH PHALANX, SUBSEQUENT ENCOUNTER: ICD-10-CM

## 2022-10-28 PROCEDURE — 99999 PR PBB SHADOW E&M-EST. PATIENT-LVL III: CPT | Mod: PBBFAC,,, | Performed by: PHYSICIAN ASSISTANT

## 2022-10-28 PROCEDURE — 99024 POSTOP FOLLOW-UP VISIT: CPT | Mod: ,,, | Performed by: PHYSICIAN ASSISTANT

## 2022-10-28 PROCEDURE — 99024 PR POST-OP FOLLOW-UP VISIT: ICD-10-PCS | Mod: ,,, | Performed by: PHYSICIAN ASSISTANT

## 2022-10-28 PROCEDURE — 99213 OFFICE O/P EST LOW 20 MIN: CPT | Mod: PBBFAC | Performed by: PHYSICIAN ASSISTANT

## 2022-10-28 PROCEDURE — 99999 PR PBB SHADOW E&M-EST. PATIENT-LVL III: ICD-10-PCS | Mod: PBBFAC,,, | Performed by: PHYSICIAN ASSISTANT

## 2022-10-28 RX ORDER — IBUPROFEN 800 MG/1
800 TABLET ORAL EVERY 8 HOURS PRN
Qty: 30 TABLET | Refills: 2 | Status: SHIPPED | OUTPATIENT
Start: 2022-10-28

## 2022-10-28 NOTE — PROGRESS NOTES
"Subjective:      Patient ID: Andres Angulo is a 37 y.o. male.    Chief Complaint: Pain and Post-op Evaluation of the Left Hand      HPI: Andres Angulo is a 37-year-old male in clinic today for postoperative follow-up.  Patient is 5 and half weeks status post debridement and wound closure of his left thumb partial amputation.  Patient is doing very well at this time.  He has returned to work without any difficulty.  He denies any signs of infection.  He has been doing the exercises as previously instructed for desensitization of the tip of the thumb    Past Medical History:   Diagnosis Date    Multiple closed fractures of right lower extremity and ribs     Multiple facial bone fractures        Current Outpatient Medications:     HYDROcodone-acetaminophen (NORCO) 5-325 mg per tablet, Take 1 tablet by mouth every 4 (four) hours as needed for Pain. (Patient not taking: Reported on 10/28/2022), Disp: 28 tablet, Rfl: 0    ibuprofen (ADVIL,MOTRIN) 800 MG tablet, Take 1 tablet (800 mg total) by mouth every 8 (eight) hours as needed for Pain. Take with food, Disp: 30 tablet, Rfl: 2  Review of patient's allergies indicates:  No Known Allergies    Ht 5' 8" (1.727 m)   Wt 56.7 kg (125 lb)   BMI 19.01 kg/m²     ROS      Objective:    Ortho Exam   Left thumb:  Incision is well healed, no signs of infection   Mild edema   No TTP   ROM is full   Motor exam normal   Sensation and pulses intact   Cap refill brisk    GEN: Well developed, well nourished male. AAOX3. No acute distress.   Head: Normocephalic, atraumatic.   Eyes: OFE  Neck: Trachea is midline, no adenopathy  Resp: Breathing unlabored.  Neuro: Motor function normal, Cranial nerves intact  Psych: Mood and affect appropriate.       Assessment:     Imaging:  No new imaging ordered today        1. 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  Recommended he " continue to increase activities as tolerated.  We have previously discussed occupational therapy, but the patient has progressed very well without formal therapy, so we will continue with the current treatment plan at this time.  Patient would like to follow up as needed going forward    Orders Placed This Encounter    ibuprofen (ADVIL,MOTRIN) 800 MG tablet     Follow up if symptoms worsen or fail to improve.          Patient note was created using MModal Dictation.  Any errors in syntax or even information may not have been identified and edited on initial review prior to signing this note.

## (undated) DEVICE — MANIFOLD 4 PORT

## (undated) DEVICE — GLOVE BIOGEL PI ORTHO PRO SZ 8

## (undated) DEVICE — SOL NS 1000CC

## (undated) DEVICE — GOWN POLY REINF BRTH SLV XL

## (undated) DEVICE — ELECTRODE REM PLYHSV RETURN 9

## (undated) DEVICE — BANDAGE MATRIX HK LOOP 4IN 5YD

## (undated) DEVICE — DRAPE T EXTRM SURG 121X128X90

## (undated) DEVICE — APPLICATOR CHLORAPREP ORN 26ML

## (undated) DEVICE — TOWEL OR DISP STRL BLUE 4/PK

## (undated) DEVICE — BANDAGE MATRIX HK LOOP 3IN 5YD

## (undated) DEVICE — PACK BASIC SETUP SC BR

## (undated) DEVICE — GAUZE SPONGE 4X4 12PLY

## (undated) DEVICE — SYR 10CC LUER LOCK

## (undated) DEVICE — GLOVE SURGICAL LATEX SZ 8

## (undated) DEVICE — COVER LIGHT HANDLE 80/CA

## (undated) DEVICE — DRESSING XEROFORM FOIL PK 1X8

## (undated) DEVICE — GLOVE SURGICAL LATEX SZ 7

## (undated) DEVICE — NDL SAFETY 25G X 1.5 ECLIPSE

## (undated) DEVICE — DRAPE THREE-QTR REINF 53X77IN